# Patient Record
Sex: FEMALE | Race: ASIAN
[De-identification: names, ages, dates, MRNs, and addresses within clinical notes are randomized per-mention and may not be internally consistent; named-entity substitution may affect disease eponyms.]

---

## 2019-01-14 ENCOUNTER — HOSPITAL ENCOUNTER (EMERGENCY)
Dept: HOSPITAL 80 - FED | Age: 19
Discharge: HOME | End: 2019-01-14
Payer: COMMERCIAL

## 2019-01-14 VITALS — DIASTOLIC BLOOD PRESSURE: 74 MMHG | SYSTOLIC BLOOD PRESSURE: 111 MMHG

## 2019-01-14 DIAGNOSIS — N10: Primary | ICD-10-CM

## 2019-01-14 DIAGNOSIS — Z79.2: ICD-10-CM

## 2019-01-14 LAB — PLATELET # BLD: (no result) 10^3/UL (ref 150–400)

## 2019-01-14 NOTE — EDPHY
H & P


Stated Complaint: dx with UTI this am and now feels worst


Time Seen by Provider: 01/14/19 04:10


HPI/ROS: 





HPI


The patient presents with left-sided flank pain associated with fever.  

Symptoms became worse tonight about 1 hr prior to presentation when she awoke 

with chills and felt warm.  She has had left flank pain for the last 2 days, 

this started slowly and is moderate in severity, it feels like a constant ache.

  She went to urgent care yesterday and was diagnosed with pyelonephritis.  She 

has had 2 doses of Bactrim.  She has not had any nausea or vomiting





REVIEW OF SYSTEMS


10 systems were reviewed and negative with the exception of the elements 

mentioned in the history of present illness.





PMHx:  Healthy





Soc Hx:  College student











PHYSICAL


General Appearance: Alert, no distress


Eyes: Pupils equal and round no pallor or injection


ENT, Mouth: Mucous membranes moist


Respiratory: There are no retractions, lungs are clear to auscultation


Cardiovascular:  Regular rate and rhythm 


Gastrointestinal:  Abdomen is soft and non-tender, no masses, bowel sounds 

normal 


Back:  Left-sided CVA tenderness


Neurological:  A&O, moves all extremities


Skin:  Warm and dry, no rashes


Musculoskeletal: Neck is supple non tender 


Extremities:  symmetrical, full range of motion 


Psychiatric:  Patient is oriented X 3, there is no agitation 





Source: Patient


Exam Limitations: No limitations





- Personal History


LMP (Females 10-55): Now


Current Tetanus/Diphtheria Vaccine: Yes


Current Tetanus Diphtheria and Acellular Pertussis (TDAP): Yes





- Medical/Surgical History


Hx Asthma: No


Hx Chronic Respiratory Disease: No


Hx Diabetes: No


Hx Cardiac Disease: No


Hx Renal Disease: No


Hx Cirrhosis: No


Hx Alcoholism: No


Hx HIV/AIDS: No


Hx Splenectomy or Spleen Trauma: No


Other PMH: denies





- Social History


Smoking Status: Current every day smoker


Constitutional: 


 Initial Vital Signs











Temperature (C)  37.4 C   01/14/19 04:03


 


Heart Rate  107 H  01/14/19 04:03


 


Respiratory Rate  16   01/14/19 04:03


 


Blood Pressure  123/75 H  01/14/19 04:03


 


O2 Sat (%)  97   01/14/19 04:03








 











O2 Delivery Mode               Room Air














Allergies/Adverse Reactions: 


 





No Known Allergies Allergy (Unverified 01/14/19 04:06)


 








Home Medications: 














 Medication  Instructions  Recorded


 


Bactrim DS  01/14/19


 


Birthcontrol  01/14/19


 


Zoloft 25mg (*)  01/14/19


 


levOFLOXACIN [Levofloxacin] 750 mg PO DAILY #7 tablet 01/14/19














Medical Decision Making


Differential Diagnosis: 





18-year-old healthy female with 2 days of left-sided flank pain which is achy 

and moderate in severity.  Diagnosed yesterday at urgent care with 

pyelonephritis and has taken 2 doses of Bactrim since.  Now with fever and 

nausea.





In the emergency department, patient received IV fluids, Toradol, ceftriaxone.  

UA was indicative of UTI with some red blood cells.  Other labs were 

unremarkable.





Her symptoms completely improved with the above treatment.  As I plan to 

discharge her home with Levaquin instead of Bactrim.  I have written her a note 

from school.





I suspect pyelonephritis though have considered pancreatitis, ovarian cysts, 

ureterolithiasis.





- Data Points


Laboratory Results: 


 Laboratory Results





 01/14/19 04:50 





 01/14/19 04:50 





 











  01/14/19 01/14/19 01/14/19





  04:50 04:50 04:20


 


WBC    REJ   





    


 


RBC    REJ   





    


 


Hgb    REJ   





    


 


Hct    REJ   





    


 


MCV    REJ   





    


 


MCH    REJ   





    


 


MCHC    REJ   





    


 


RDW    REJ   





    


 


Plt Count    REJ   





    


 


MPV    REJ   





    


 


Neut % (Auto)    REJ   





    


 


Lymph % (Auto)    REJ   





    


 


Mono % (Auto)    REJ   





    


 


Eos % (Auto)    REJ   





    


 


Baso % (Auto)    REJ   





    


 


Nucleat RBC Rel Count    REJ   





    


 


Absolute Neuts (auto)    REJ   





    


 


Absolute Lymphs (auto)    REJ   





    


 


Absolute Monos (auto)    REJ   





    


 


Absolute Eos (auto)    REJ   





    


 


Absolute Basos (auto)    REJ   





    


 


Absolute Nucleated RBC    REJ   





    


 


Immature Gran %    REJ   





    


 


Immature Gran #    REJ   





    


 


Sodium  132 mEq/L L mEq/L    





   (135-145)   


 


Potassium  4.7 mEq/L mEq/L    





   (3.5-5.2)   


 


Chloride  108 mEq/L mEq/L    





   ()   


 


Carbon Dioxide  15 mEq/l L mEq/l    





   (22-31)   


 


Anion Gap  9 mEq/L mEq/L    





   (6-14)   


 


BUN  14 mg/dL mg/dL    





   (7-23)   


 


Creatinine  0.9 mg/dL mg/dL    





   (0.6-1.0)   


 


Estimated GFR  > 60     





    


 


Glucose  98 mg/dL mg/dL    





   ()   


 


Calcium  9.3 mg/dL mg/dL    





   (8.5-10.4)   


 


Specimen Hemolysis  140     





    


 


Urine Color      YELLOW 





    


 


Urine Appearance      CLEAR 





    


 


Urine pH      6.0 





     (5.0-7.5) 


 


Ur Specific Gravity      1.018 





     (1.002-1.030) 


 


Urine Protein      NEGATIVE 





     (NEGATIVE) 


 


Urine Ketones      NEGATIVE 





     (NEGATIVE) 


 


Urine Blood      1+  H 





     (NEGATIVE) 


 


Urine Nitrate      NEGATIVE 





     (NEGATIVE) 


 


Urine Bilirubin      NEGATIVE 





     (NEGATIVE) 


 


Urine Urobilinogen      NEGATIVE EU EU





     (0.2-1.0) 


 


Ur Leukocyte Esterase      TRACE  H 





     (NEGATIVE) 


 


Urine RBC      25-50 /hpf H /hpf





     (0-3) 


 


Urine WBC      10-15 /hpf H /hpf





     (0-3) 


 


Ur Epithelial Cells      TRACE /lpf /lpf





     (NONE-1+) 


 


Urine Mucus      TRACE /lpf /lpf





     (NONE-1+) 


 


Urine Glucose      NEGATIVE 





     (NEGATIVE) 











Medications Given: 


 








Discontinued Medications





Ceftriaxone Sodium/Dextrose (Rocephin 1 Gm (Premix))  50 mls @ 100 mls/hr IV 

EDNOW ONE


   PRN Reason: Protocol


   Stop: 01/14/19 05:06


   Last Admin: 01/14/19 04:49 Dose:  50 mls


Sodium Chloride (Ns)  1,000 mls @ 0 mls/hr IV EDNOW ONE; Wide Open


   PRN Reason: Protocol


   Stop: 01/14/19 04:38


   Last Admin: 01/14/19 04:50 Dose:  1,000 mls


Ketorolac Tromethamine (Toradol)  15 mg IVP EDNOW ONE


   Stop: 01/14/19 04:38


   Last Admin: 01/14/19 04:49 Dose:  15 mg








Departure





- Departure


Disposition: Home, Routine, Self-Care


Clinical Impression: 


 Pyelonephritis





Condition: Good


Instructions:  Kidney Infection (ED)


Additional Instructions: 


I recommend you take ibuprofen 400 mg with acetaminophen 650 mg every 6 hr as 

needed for pain and fever.  You should take the antibiotic as prescribed.  You 

should stop taking your current antibiotic.


Referrals: 


ALTAF HARE [Primary Care Provider] - As per Instructions


Prescriptions: 


levOFLOXACIN [Levofloxacin] 750 mg PO DAILY #7 tablet